# Patient Record
Sex: MALE | Race: WHITE | Employment: STUDENT | URBAN - METROPOLITAN AREA
[De-identification: names, ages, dates, MRNs, and addresses within clinical notes are randomized per-mention and may not be internally consistent; named-entity substitution may affect disease eponyms.]

---

## 2019-12-27 ENCOUNTER — TRANSFERRED RECORDS (OUTPATIENT)
Dept: HEALTH INFORMATION MANAGEMENT | Facility: CLINIC | Age: 24
End: 2019-12-27

## 2020-01-28 ENCOUNTER — ANCILLARY PROCEDURE (OUTPATIENT)
Dept: ULTRASOUND IMAGING | Facility: CLINIC | Age: 25
End: 2020-01-28
Attending: FAMILY MEDICINE
Payer: COMMERCIAL

## 2020-01-28 ENCOUNTER — MEDICAL CORRESPONDENCE (OUTPATIENT)
Dept: HEALTH INFORMATION MANAGEMENT | Facility: CLINIC | Age: 25
End: 2020-01-28

## 2020-01-28 ENCOUNTER — TRANSFERRED RECORDS (OUTPATIENT)
Dept: HEALTH INFORMATION MANAGEMENT | Facility: CLINIC | Age: 25
End: 2020-01-28

## 2020-01-28 DIAGNOSIS — R59.1 LAD (LYMPHADENOPATHY): ICD-10-CM

## 2020-01-29 ENCOUNTER — TELEPHONE (OUTPATIENT)
Dept: OTOLARYNGOLOGY | Facility: CLINIC | Age: 25
End: 2020-01-29

## 2020-01-29 NOTE — TELEPHONE ENCOUNTER
Routing to Clinic Coordinator Pool to call pt and schedule next available NEW appt w/ a Head/Neck provider.      Jeanne Celaya LPN

## 2020-01-29 NOTE — TELEPHONE ENCOUNTER
[facility] Call Center    Phone Message    May a detailed message be left on voicemail: yes    Reason for Call: [facility] is referring patient for a Lymphadenopathy.  Patient had imaging done through [facility] on 1/28/20, results are in the system  Please call patient with appointment options    Action Taken: Message routed to:  [facility] ([facility] ENT

## 2020-01-29 NOTE — TELEPHONE ENCOUNTER
FUTURE VISIT INFORMATION      FUTURE VISIT INFORMATION:    Date: 2/12/20    Time: 8:20 AM    Location: [facility]  REFERRAL INFORMATION:    Referring provider:  Dr. Tamera Jj    Referring providers clinic:  [facility]    Reason for visit/diagnosis: Lymphadenopathy    RECORDS REQUESTED FROM:       Clinic name Comments Records Status Imaging Status   [facility] 1/28/20 - OV and Referral with Dr. Jj  12/27/19 - OV with Dr. Jj 1/30 Sent to Scan    [facility] - Imaging 1/28/20 - US Head Neck Soft Tissue Epic PACs                             * 1/29/20 1:40 PM faxed Urgent Request to [facility] for records and referral to review in case additional testing needed - Sarah  * 1/30/20 8:05 AM Received recs from [facility] and sent to HIM to be scanned into the chart - Sarah

## 2020-02-11 NOTE — PROGRESS NOTES
Dear Dr. Jj:    I had the pleasure of meeting Jona North in consultation today at the [facility] at your request.     History of Present Illness:   Jona North is a 25 year old man who is referred for evaluation of lymphadenopathy. He was seen in December and January at [facility] for lymphadenopathy. He had lab workup done which included a negative TB test, negative heterophile monospot, normal CBC as well as additional negative HIV. He had an ultrasound done which showed reactive nodes with normal fatty hilum. He also reports a feeling of globus with swallowing. He does endorse a history of heartburn and was started on antacid. He said the feeling has not resolved. He says it is constant with drinking things. He thinks the lymph nodes fluctuate in size. He thinks they are smaller in the morning. He has no fevers, chills, nightsweats. He has no sore throat, hemoptysis, otalgia, odynophagia. He has no changes in his weight.    Past medical history: denies    Past surgical history: denies    Social history: Quit smoking 2 years ago - smoked occasionally for 2 years. No chewing tobacco. Occasional alcohol. 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 Lived in [place], moved here 4 years ago.    Family history: Negative for H&N cancer or lymphoma    MEDICATIONS:     Current Outpatient Medications   Medication Sig Dispense Refill     ibuprofen (ADVIL/MOTRIN) 200 MG tablet Take 200-400 mg by mouth       omeprazole (PRILOSEC) 40 MG DR capsule          ALLERGIES:  No Known Allergies    HABITS/SOCIAL HISTORY:   Quit smoking 2 years ago - smoked occasionally for 2 years. No chewing tobacco. Occasional alcohol.   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   Lived in [place], moved here 4 years ago.    Social History     Socioeconomic History     Marital status: Single     Spouse name: Not on file     Number of children: Not on file     Years  of education: Not on file     Highest education level: Not on file   Occupational History     Not on file   Social Needs     Financial resource strain: Not on file     Food insecurity:     Worry: Not on file     Inability: Not on file     Transportation needs:     Medical: Not on file     Non-medical: Not on file   Tobacco Use     Smoking status: Former Smoker     Packs/day: 0.10     Years: 3.00     Pack years: 0.30     Types: Cigarettes     Start date: 10/1/2014     Last attempt to quit: 2017     Years since quittin.2     Smokeless tobacco: Never Used     Tobacco comment: I smoked cigarettes occasionally and usually not more than 2 cigarettes per day   Substance and Sexual Activity     Alcohol use: Not Currently     Comment: I drink alcoholic drinks ones a month on average     Drug use: Never     Sexual activity: Yes     Partners: Female     Birth control/protection: Condom   Lifestyle     Physical activity:     Days per week: Not on file     Minutes per session: Not on file     Stress: Not on file   Relationships     Social connections:     Talks on phone: Not on file     Gets together: Not on file     Attends Quaker service: Not on file     Active member of club or organization: Not on file     Attends meetings of clubs or organizations: Not on file     Relationship status: Not on file     Intimate partner violence:     Fear of current or ex partner: Not on file     Emotionally abused: Not on file     Physically abused: Not on file     Forced sexual activity: Not on file   Other Topics Concern     Not on file   Social History Narrative     Not on file       PAST MEDICAL HISTORY:   Past Medical History:   Diagnosis Date     Gastroesophageal reflux disease 2020     Hoarseness         PAST SURGICAL HISTORY: No past surgical history on file.    FAMILY HISTORY:    Family History   Problem Relation Age of Onset     Hypertension Father        REVIEW OF SYSTEMS:  12 point ROS was negative  "other than the symptoms noted above in the HPI.  Patient Supplied Answers to Review of Systems  [facility] ROS 2/9/2020   Neurology Numbness   Psychology Frequently feeling anxious   Ears, Nose, Throat Hoarseness   Cardiopulmonary Chest pain   Gastrointestinal/Genitourinary Heartburn/indigestion   Hematologic Lymph node swelling         PHYSICAL EXAMINATION:   /78   Pulse 88   Temp 97.4  F (36.3  C)   Resp 18   Ht 1.7 m (5' 6.93\")   Wt 64.9 kg (143 lb)   BMI 22.44 kg/m     Appearance:   normal; NAD, age-appropriate appearance, well-developed, normal habitus   Communication:   normal; communicates verbally, normal voice quality   Head/Face:   inspection -  Normal; no scars or visible lesions   Salivary glands -  Normal size, no tenderness, swelling, or palpable masses   Facial strength -  Normal and symmetric   Skin:  normal, no rash   Ocular Motility:  normal occular movements   Ears:  auricle (AD) -  normal  EAC (AD) -  normal  TM (AD) -  Normal, no effusion  auricle (AS) -  normal  EAC (AS) -  normal  TM (AS) -  Normal, no effusion  Normal clinical speech reception   Nose:  Ext. inspection -  Normal  Internal Inspection -  Normal mucosa, septum, and turbinates; large right septal spur near the floor nearly touching the turbinate   Nasopharynx:  normal mucosa, no masses   Oral Cavity:  lips -  Normal mucosa, oral competence, and stoma size   Age-appropriate dentition, healthy gingival mucosa   Hard palate, buccal, floor of mouth mucosa normal   Tongue - normal movement, no lesions   Oropharynx:  mucosa -  Normal, no lesions  Posterior pharyngeal wall with mild cobblestoning  soft palate -  Normal, no lesions, no asymmetry, normal elevation  tonsils -  Normal, no exudates, no abnormal lesions, symmetric   Hypopharynx:  Normal pyriform sinus and pharyngeal wall mucosa   No pooled secretions    Larynx:  Epiglottis, AE folds, false vocal cords, true vocal cords, arytenoids normal in appearance, bilaterally " mobile cords   Neck: No visible mass or asymmetry   Normal palpation, no tenderness, no tracheal deviation  Normal range of motion   Lymphatic:  no abnormal nodes; right level V node is less than 1 cm in size, mobile   Cardiovascular:  warm, pink, well-perfused extremities without swelling, tenderness, or edema   Respiratory:  Normal respiratory effort, no stridor   Neuro/Psych.:  mood/affect -  normal  mental status -  normal          PROCEDURES:   Flexible fiberoptic laryngoscopy: Scope exam was indicated due to globus. Verbal consent was obtained. The nasal cavity was prepped with an aerosolized solution of topical anesthetic and vasoconstrictive agent. The scope was passed through the anterior nasal cavity and advanced. Inspection of the nasopharynx revealed no gross abnormality. The base of tongue and vallecula are normal. There is mild cobblestoning of the superior posterior pharyngeal wall. The epiglottis, AE folds, false cords, true cords, arytenoids are normal.  Inspection of the larynx revealed bilaterally mobile vocal cords. Pyriform sinuses are symmetric. The airway is patent. Procedure tolerated well with no immediate complications noted.        RESULTS REVIEWED:   I reviewed the referral records from [place], the lab results, ultrasound results    FINDINGS:     Bilateral multiple cervical lymph nodes, not larger than 1 cm in short  axis with preserved fatty hilum. Largest lymph node nodes are measured  bilaterally with measurements given in transverse, AP, and  craniocaudal dimensions as follows:     Right:     Level 2:   1) 1.5 x 1.6 x 5 mm ovoid lymph nodes with fatty echogenic hilum.  2) 1.8 x 1.9 x 0.8 cm ovoid lymph nodes with fatty echogenic hilum.     Level 3: 1 x 1.8 x 0. 3 cm lymph node with preserved fatty hilum. The  cortex is heterogeneous and hyperechogenic with minimal increased  vascularity  Level 4: None  Level 5: None  Level 6: None     Left:     Level 2: 1.3 x 0.6 x 2.6 cm  Level 3:  None  Level 4: None  Level 5: None  Level 6: None     Right thyroid lobe measures 1.8 x 1.6 x 5.6 cm. Left thyroid lobe  measures 1.3 x 1.2 x 5.4 cm. Isthmus is 2 mm. Normal echogenicity of  the thyroid gland. Normal vascularity. On color Doppler. No suspicious  nodule.                                                                      IMPRESSION:  1.  Bilateral small cervical lymph nodes favored to be reactive in  nature.      IMPRESSION AND PLAN:   Jona North is a 25 year old man referred for evaluation of lymphadenopathy. The patient had undergone an U/S which shows small cervical nodes with normal fatty hilum, likely reactive, not concerning on exam. Would not pursue further workup at this time unless they increased in size or changed.    He is also complaining of globus with swallowing. He has already been prescribed a PPI which he used for 2 weeks. There is nothing concerning on scope exam, mild cobblestoning of posterior pharyngeal wall. Can consider continuing PPI. Defer further workup to PCP.    F/u PRN.    Thank you very much for the opportunity to participate in the care of your patient.      Missy Fernandez MD, M.D.  Otolaryngology- Head & Neck Surgery      This note was dictated with voice recognition software and then edited. Please excuse any unintentional errors.         CC:  Tamera Jj MD  [address] [address]

## 2020-02-12 ENCOUNTER — PRE VISIT (OUTPATIENT)
Dept: OTOLARYNGOLOGY | Facility: CLINIC | Age: 25
End: 2020-02-12

## 2020-02-12 ENCOUNTER — OFFICE VISIT (OUTPATIENT)
Dept: OTOLARYNGOLOGY | Facility: CLINIC | Age: 25
End: 2020-02-12
Payer: COMMERCIAL

## 2020-02-12 VITALS
WEIGHT: 143 LBS | RESPIRATION RATE: 18 BRPM | SYSTOLIC BLOOD PRESSURE: 132 MMHG | DIASTOLIC BLOOD PRESSURE: 78 MMHG | HEIGHT: 67 IN | TEMPERATURE: 97.4 F | HEART RATE: 88 BPM | BODY MASS INDEX: 22.44 KG/M2

## 2020-02-12 DIAGNOSIS — R59.1 LYMPHADENOPATHY: Primary | ICD-10-CM

## 2020-02-12 RX ORDER — OMEPRAZOLE 40 MG/1
CAPSULE, DELAYED RELEASE ORAL
COMMUNITY
Start: 2020-01-28

## 2020-02-12 RX ORDER — IBUPROFEN 200 MG
200-400 TABLET ORAL
COMMUNITY

## 2020-02-12 ASSESSMENT — PAIN SCALES - GENERAL: PAINLEVEL: NO PAIN (0)

## 2020-02-12 ASSESSMENT — MIFFLIN-ST. JEOR: SCORE: 1581.14

## 2020-02-12 NOTE — LETTER
2/12/2020       RE: Jona North  [address]  [place] [address]     Dear Colleague,    Thank you for referring your patient, Jona North, to the [facility] EAR [facility] THROAT at [facility] Please see a copy of my visit note below.    Dear Dr. Jj:    I had the pleasure of meeting Jona North in consultation today at the [facility] at your request.     History of Present Illness:   Jona North is a 25 year old man who is referred for evaluation of lymphadenopathy. He was seen in December and January at [facility] for lymphadenopathy. He had lab workup done which included a negative TB test, negative heterophile monospot, normal CBC as well as additional negative HIV. He had an ultrasound done which showed reactive nodes with normal fatty hilum. He also reports a feeling of globus with swallowing. He does endorse a history of heartburn and was started on antacid. He said the feeling has not resolved. He says it is constant with drinking things. He thinks the lymph nodes fluctuate in size. He thinks they are smaller in the morning. He has no fevers, chills, nightsweats. He has no sore throat, hemoptysis, otalgia, odynophagia. He has no changes in his weight.    Past medical history: denies    Past surgical history: denies    Social history: Quit smoking 2 years ago - smoked occasionally for 2 years. No chewing tobacco. Occasional alcohol. 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 Lived in [place], moved here 4 years ago.    Family history: Negative for H&N cancer or lymphoma    MEDICATIONS:     Current Outpatient Medications   Medication Sig Dispense Refill     ibuprofen (ADVIL/MOTRIN) 200 MG tablet Take 200-400 mg by mouth       omeprazole (PRILOSEC) 40 MG DR capsule          ALLERGIES:  No Known Allergies    HABITS/SOCIAL HISTORY:   Quit smoking 2 years ago - smoked  occasionally for 2 years. No chewing tobacco. Occasional alcohol.   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   Lived in [place], moved here 4 years ago.    Social History     Socioeconomic History     Marital status: Single     Spouse name: Not on file     Number of children: Not on file     Years of education: Not on file     Highest education level: Not on file   Occupational History     Not on file   Social Needs     Financial resource strain: Not on file     Food insecurity:     Worry: Not on file     Inability: Not on file     Transportation needs:     Medical: Not on file     Non-medical: Not on file   Tobacco Use     Smoking status: Former Smoker     Packs/day: 0.10     Years: 3.00     Pack years: 0.30     Types: Cigarettes     Start date: 10/1/2014     Last attempt to quit: 2017     Years since quittin.2     Smokeless tobacco: Never Used     Tobacco comment: I smoked cigarettes occasionally and usually not more than 2 cigarettes per day   Substance and Sexual Activity     Alcohol use: Not Currently     Comment: I drink alcoholic drinks ones a month on average     Drug use: Never     Sexual activity: Yes     Partners: Female     Birth control/protection: Condom   Lifestyle     Physical activity:     Days per week: Not on file     Minutes per session: Not on file     Stress: Not on file   Relationships     Social connections:     Talks on phone: Not on file     Gets together: Not on file     Attends Hoahaoism service: Not on file     Active member of club or organization: Not on file     Attends meetings of clubs or organizations: Not on file     Relationship status: Not on file     Intimate partner violence:     Fear of current or ex partner: Not on file     Emotionally abused: Not on file     Physically abused: Not on file     Forced sexual activity: Not on file   Other Topics Concern     Not on file   Social History Narrative     Not on file       PAST MEDICAL HISTORY:   Past Medical  "History:   Diagnosis Date     Gastroesophageal reflux disease 01/14/2020     Hoarseness January 26th        PAST SURGICAL HISTORY: No past surgical history on file.    FAMILY HISTORY:    Family History   Problem Relation Age of Onset     Hypertension Father        REVIEW OF SYSTEMS:  12 point ROS was negative other than the symptoms noted above in the HPI.  Patient Supplied Answers to Review of Systems  [facility] ROS 2/9/2020   Neurology Numbness   Psychology Frequently feeling anxious   Ears, Nose, Throat Hoarseness   Cardiopulmonary Chest pain   Gastrointestinal/Genitourinary Heartburn/indigestion   Hematologic Lymph node swelling         PHYSICAL EXAMINATION:   /78   Pulse 88   Temp 97.4  F (36.3  C)   Resp 18   Ht 1.7 m (5' 6.93\")   Wt 64.9 kg (143 lb)   BMI 22.44 kg/m      Appearance:   normal; NAD, age-appropriate appearance, well-developed, normal habitus   Communication:   normal; communicates verbally, normal voice quality   Head/Face:   inspection -  Normal; no scars or visible lesions   Salivary glands -  Normal size, no tenderness, swelling, or palpable masses   Facial strength -  Normal and symmetric   Skin:  normal, no rash   Ocular Motility:  normal occular movements   Ears:  auricle (AD) -  normal  EAC (AD) -  normal  TM (AD) -  Normal, no effusion  auricle (AS) -  normal  EAC (AS) -  normal  TM (AS) -  Normal, no effusion  Normal clinical speech reception   Nose:  Ext. inspection -  Normal  Internal Inspection -  Normal mucosa, septum, and turbinates; large right septal spur near the floor nearly touching the turbinate   Nasopharynx:  normal mucosa, no masses   Oral Cavity:  lips -  Normal mucosa, oral competence, and stoma size   Age-appropriate dentition, healthy gingival mucosa   Hard palate, buccal, floor of mouth mucosa normal   Tongue - normal movement, no lesions   Oropharynx:  mucosa -  Normal, no lesions  Posterior pharyngeal wall with mild cobblestoning  soft palate -  Normal, no " lesions, no asymmetry, normal elevation  tonsils -  Normal, no exudates, no abnormal lesions, symmetric   Hypopharynx:  Normal pyriform sinus and pharyngeal wall mucosa   No pooled secretions    Larynx:  Epiglottis, AE folds, false vocal cords, true vocal cords, arytenoids normal in appearance, bilaterally mobile cords   Neck: No visible mass or asymmetry   Normal palpation, no tenderness, no tracheal deviation  Normal range of motion   Lymphatic:  no abnormal nodes; right level V node is less than 1 cm in size, mobile   Cardiovascular:  warm, pink, well-perfused extremities without swelling, tenderness, or edema   Respiratory:  Normal respiratory effort, no stridor   Neuro/Psych.:  mood/affect -  normal  mental status -  normal          PROCEDURES:   Flexible fiberoptic laryngoscopy: Scope exam was indicated due to globus. Verbal consent was obtained. The nasal cavity was prepped with an aerosolized solution of topical anesthetic and vasoconstrictive agent. The scope was passed through the anterior nasal cavity and advanced. Inspection of the nasopharynx revealed no gross abnormality. The base of tongue and vallecula are normal. There is mild cobblestoning of the superior posterior pharyngeal wall. The epiglottis, AE folds, false cords, true cords, arytenoids are normal.  Inspection of the larynx revealed bilaterally mobile vocal cords. Pyriform sinuses are symmetric. The airway is patent. Procedure tolerated well with no immediate complications noted.        RESULTS REVIEWED:   I reviewed the referral records from [place], the lab results, ultrasound results    FINDINGS:     Bilateral multiple cervical lymph nodes, not larger than 1 cm in short  axis with preserved fatty hilum. Largest lymph node nodes are measured  bilaterally with measurements given in transverse, AP, and  craniocaudal dimensions as follows:     Right:     Level 2:   1) 1.5 x 1.6 x 5 mm ovoid lymph nodes with fatty echogenic hilum.  2) 1.8 x 1.9  x 0.8 cm ovoid lymph nodes with fatty echogenic hilum.     Level 3: 1 x 1.8 x 0. 3 cm lymph node with preserved fatty hilum. The  cortex is heterogeneous and hyperechogenic with minimal increased  vascularity  Level 4: None  Level 5: None  Level 6: None     Left:     Level 2: 1.3 x 0.6 x 2.6 cm  Level 3: None  Level 4: None  Level 5: None  Level 6: None     Right thyroid lobe measures 1.8 x 1.6 x 5.6 cm. Left thyroid lobe  measures 1.3 x 1.2 x 5.4 cm. Isthmus is 2 mm. Normal echogenicity of  the thyroid gland. Normal vascularity. On color Doppler. No suspicious  nodule.                                                                      IMPRESSION:  1.  Bilateral small cervical lymph nodes favored to be reactive in  nature.      IMPRESSION AND PLAN:   Jona North is a 25 year old man referred for evaluation of lymphadenopathy. The patient had undergone an U/S which shows small cervical nodes with normal fatty hilum, likely reactive, not concerning on exam. Would not pursue further workup at this time unless they increased in size or changed.    He is also complaining of globus with swallowing. He has already been prescribed a PPI which he used for 2 weeks. There is nothing concerning on scope exam, mild cobblestoning of posterior pharyngeal wall. Can consider continuing PPI. Defer further workup to PCP.    F/u PRN.    Thank you very much for the opportunity to participate in the care of your patient.      Missy Fernandez MD, M.D.  Otolaryngology- Head & Neck Surgery      This note was dictated with voice recognition software and then edited. Please excuse any unintentional errors.         CC:  Tamera Jj MD  [address] [address]

## 2020-02-12 NOTE — NURSING NOTE
"Chief Complaint   Patient presents with     Consult     lymphadenopathy      Blood pressure 132/78, pulse 88, temperature 97.4  F (36.3  C), resp. rate 18, height 1.7 m (5' 6.93\"), weight 64.9 kg (143 lb).    Guanako Green LPN      "

## 2020-02-12 NOTE — LETTER
2/12/2020      RE: Jona North  [address]  [place] [address]       Dear Dr. Jj:    I had the pleasure of meeting Jona North in consultation today at the [facility] at your request.     History of Present Illness:   Jona North is a 25 year old man who is referred for evaluation of lymphadenopathy. He was seen in December and January at [facility] for lymphadenopathy. He had lab workup done which included a negative TB test, negative heterophile monospot, normal CBC as well as additional negative HIV. He had an ultrasound done which showed reactive nodes with normal fatty hilum. He also reports a feeling of globus with swallowing. He does endorse a history of heartburn and was started on antacid. He said the feeling has not resolved. He says it is constant with drinking things. He thinks the lymph nodes fluctuate in size. He thinks they are smaller in the morning. He has no fevers, chills, nightsweats. He has no sore throat, hemoptysis, otalgia, odynophagia. He has no changes in his weight.    Past medical history: denies    Past surgical history: denies    Social history: Quit smoking 2 years ago - smoked occasionally for 2 years. No chewing tobacco. Occasional alcohol. 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 Lived in [place], moved here 4 years ago.    Family history: Negative for H&N cancer or lymphoma    MEDICATIONS:     Current Outpatient Medications   Medication Sig Dispense Refill     ibuprofen (ADVIL/MOTRIN) 200 MG tablet Take 200-400 mg by mouth       omeprazole (PRILOSEC) 40 MG DR capsule          ALLERGIES:  No Known Allergies    HABITS/SOCIAL HISTORY:   Quit smoking 2 years ago - smoked occasionally for 2 years. No chewing tobacco. Occasional alcohol.   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   Lived in [place], moved here 4 years ago.    Social History     Socioeconomic History      Marital status: Single     Spouse name: Not on file     Number of children: Not on file     Years of education: Not on file     Highest education level: Not on file   Occupational History     Not on file   Social Needs     Financial resource strain: Not on file     Food insecurity:     Worry: Not on file     Inability: Not on file     Transportation needs:     Medical: Not on file     Non-medical: Not on file   Tobacco Use     Smoking status: Former Smoker     Packs/day: 0.10     Years: 3.00     Pack years: 0.30     Types: Cigarettes     Start date: 10/1/2014     Last attempt to quit: 2017     Years since quittin.2     Smokeless tobacco: Never Used     Tobacco comment: I smoked cigarettes occasionally and usually not more than 2 cigarettes per day   Substance and Sexual Activity     Alcohol use: Not Currently     Comment: I drink alcoholic drinks ones a month on average     Drug use: Never     Sexual activity: Yes     Partners: Female     Birth control/protection: Condom   Lifestyle     Physical activity:     Days per week: Not on file     Minutes per session: Not on file     Stress: Not on file   Relationships     Social connections:     Talks on phone: Not on file     Gets together: Not on file     Attends [employer] service: Not on file     Active member of club or organization: Not on file     Attends meetings of clubs or organizations: Not on file     Relationship status: Not on file     Intimate partner violence:     Fear of current or ex partner: Not on file     Emotionally abused: Not on file     Physically abused: Not on file     Forced sexual activity: Not on file   Other Topics Concern     Not on file   Social History Narrative     Not on file       PAST MEDICAL HISTORY:   Past Medical History:   Diagnosis Date     Gastroesophageal reflux disease 2020     Hoarseness         PAST SURGICAL HISTORY: No past surgical history on file.    FAMILY HISTORY:    Family History   Problem  "Relation Age of Onset     Hypertension Father        REVIEW OF SYSTEMS:  12 point ROS was negative other than the symptoms noted above in the HPI.  Patient Supplied Answers to Review of Systems  [facility] ROS 2/9/2020   Neurology Numbness   Psychology Frequently feeling anxious   Ears, Nose, Throat Hoarseness   Cardiopulmonary Chest pain   Gastrointestinal/Genitourinary Heartburn/indigestion   Hematologic Lymph node swelling         PHYSICAL EXAMINATION:   /78   Pulse 88   Temp 97.4  F (36.3  C)   Resp 18   Ht 1.7 m (5' 6.93\")   Wt 64.9 kg (143 lb)   BMI 22.44 kg/m      Appearance:   normal; NAD, age-appropriate appearance, well-developed, normal habitus   Communication:   normal; communicates verbally, normal voice quality   Head/Face:   inspection -  Normal; no scars or visible lesions   Salivary glands -  Normal size, no tenderness, swelling, or palpable masses   Facial strength -  Normal and symmetric   Skin:  normal, no rash   Ocular Motility:  normal occular movements   Ears:  auricle (AD) -  normal  EAC (AD) -  normal  TM (AD) -  Normal, no effusion  auricle (AS) -  normal  EAC (AS) -  normal  TM (AS) -  Normal, no effusion  Normal clinical speech reception   Nose:  Ext. inspection -  Normal  Internal Inspection -  Normal mucosa, septum, and turbinates; large right septal spur near the floor nearly touching the turbinate   Nasopharynx:  normal mucosa, no masses   Oral Cavity:  lips -  Normal mucosa, oral competence, and stoma size   Age-appropriate dentition, healthy gingival mucosa   Hard palate, buccal, floor of mouth mucosa normal   Tongue - normal movement, no lesions   Oropharynx:  mucosa -  Normal, no lesions  Posterior pharyngeal wall with mild cobblestoning  soft palate -  Normal, no lesions, no asymmetry, normal elevation  tonsils -  Normal, no exudates, no abnormal lesions, symmetric   Hypopharynx:  Normal pyriform sinus and pharyngeal wall mucosa   No pooled secretions    Larynx:  " Epiglottis, AE folds, false vocal cords, true vocal cords, arytenoids normal in appearance, bilaterally mobile cords   Neck: No visible mass or asymmetry   Normal palpation, no tenderness, no tracheal deviation  Normal range of motion   Lymphatic:  no abnormal nodes; right level V node is less than 1 cm in size, mobile   Cardiovascular:  warm, pink, well-perfused extremities without swelling, tenderness, or edema   Respiratory:  Normal respiratory effort, no stridor   Neuro/Psych.:  mood/affect -  normal  mental status -  normal          PROCEDURES:   Flexible fiberoptic laryngoscopy: Scope exam was indicated due to globus. Verbal consent was obtained. The nasal cavity was prepped with an aerosolized solution of topical anesthetic and vasoconstrictive agent. The scope was passed through the anterior nasal cavity and advanced. Inspection of the nasopharynx revealed no gross abnormality. The base of tongue and vallecula are normal. There is mild cobblestoning of the superior posterior pharyngeal wall. The epiglottis, AE folds, false cords, true cords, arytenoids are normal.  Inspection of the larynx revealed bilaterally mobile vocal cords. Pyriform sinuses are symmetric. The airway is patent. Procedure tolerated well with no immediate complications noted.        RESULTS REVIEWED:   I reviewed the referral records from [place], the lab results, ultrasound results    FINDINGS:     Bilateral multiple cervical lymph nodes, not larger than 1 cm in short  axis with preserved fatty hilum. Largest lymph node nodes are measured  bilaterally with measurements given in transverse, AP, and  craniocaudal dimensions as follows:     Right:     Level 2:   1) 1.5 x 1.6 x 5 mm ovoid lymph nodes with fatty echogenic hilum.  2) 1.8 x 1.9 x 0.8 cm ovoid lymph nodes with fatty echogenic hilum.     Level 3: 1 x 1.8 x 0. 3 cm lymph node with preserved fatty hilum. The  cortex is heterogeneous and hyperechogenic with minimal  increased  vascularity  Level 4: None  Level 5: None  Level 6: None     Left:     Level 2: 1.3 x 0.6 x 2.6 cm  Level 3: None  Level 4: None  Level 5: None  Level 6: None     Right thyroid lobe measures 1.8 x 1.6 x 5.6 cm. Left thyroid lobe  measures 1.3 x 1.2 x 5.4 cm. Isthmus is 2 mm. Normal echogenicity of  the thyroid gland. Normal vascularity. On color Doppler. No suspicious  nodule.                                                                      IMPRESSION:  1.  Bilateral small cervical lymph nodes favored to be reactive in  nature.      IMPRESSION AND PLAN:   Jona North is a 25 year old man referred for evaluation of lymphadenopathy. The patient had undergone an U/S which shows small cervical nodes with normal fatty hilum, likely reactive, not concerning on exam. Would not pursue further workup at this time unless they increased in size or changed.    He is also complaining of globus with swallowing. He has already been prescribed a PPI which he used for 2 weeks. There is nothing concerning on scope exam, mild cobblestoning of posterior pharyngeal wall. Can consider continuing PPI. Defer further workup to PCP.    F/u PRN.    Thank you very much for the opportunity to participate in the care of your patient.      Missy Fernandez MD, M.D.  Otolaryngology- Head & Neck Surgery      This note was dictated with voice recognition software and then edited. Please excuse any unintentional errors.         CC:  Tamera Jj MD  [address] [address]

## 2020-03-11 ENCOUNTER — HEALTH MAINTENANCE LETTER (OUTPATIENT)
Age: 25
End: 2020-03-11

## 2021-01-04 ENCOUNTER — HEALTH MAINTENANCE LETTER (OUTPATIENT)
Age: 26
End: 2021-01-04

## 2021-04-25 ENCOUNTER — HEALTH MAINTENANCE LETTER (OUTPATIENT)
Age: 26
End: 2021-04-25

## 2021-10-10 ENCOUNTER — HEALTH MAINTENANCE LETTER (OUTPATIENT)
Age: 26
End: 2021-10-10

## 2022-05-22 ENCOUNTER — HEALTH MAINTENANCE LETTER (OUTPATIENT)
Age: 27
End: 2022-05-22

## 2022-09-18 ENCOUNTER — HEALTH MAINTENANCE LETTER (OUTPATIENT)
Age: 27
End: 2022-09-18

## 2023-06-04 ENCOUNTER — HEALTH MAINTENANCE LETTER (OUTPATIENT)
Age: 28
End: 2023-06-04